# Patient Record
Sex: FEMALE | Race: WHITE
[De-identification: names, ages, dates, MRNs, and addresses within clinical notes are randomized per-mention and may not be internally consistent; named-entity substitution may affect disease eponyms.]

---

## 2017-01-10 ENCOUNTER — APPOINTMENT (OUTPATIENT)
Dept: FAMILY MEDICINE | Facility: CLINIC | Age: 47
End: 2017-01-10

## 2019-05-08 ENCOUNTER — APPOINTMENT (OUTPATIENT)
Dept: FAMILY MEDICINE | Facility: CLINIC | Age: 49
End: 2019-05-08
Payer: COMMERCIAL

## 2019-05-08 VITALS
DIASTOLIC BLOOD PRESSURE: 62 MMHG | OXYGEN SATURATION: 98 % | RESPIRATION RATE: 16 BRPM | HEIGHT: 65 IN | BODY MASS INDEX: 29.99 KG/M2 | SYSTOLIC BLOOD PRESSURE: 118 MMHG | HEART RATE: 109 BPM | WEIGHT: 180 LBS

## 2019-05-08 DIAGNOSIS — M21.619 BUNION OF UNSPECIFIED FOOT: ICD-10-CM

## 2019-05-08 DIAGNOSIS — Z01.818 ENCOUNTER FOR OTHER PREPROCEDURAL EXAMINATION: ICD-10-CM

## 2019-05-08 PROCEDURE — 99244 OFF/OP CNSLTJ NEW/EST MOD 40: CPT | Mod: 25

## 2019-05-08 PROCEDURE — 36415 COLL VENOUS BLD VENIPUNCTURE: CPT

## 2019-05-08 RX ORDER — BUPROPION HYDROCHLORIDE 100 MG/1
100 TABLET, FILM COATED ORAL DAILY
Refills: 0 | Status: ACTIVE | COMMUNITY

## 2019-05-08 NOTE — RESULTS/DATA
[] : results reviewed [Normal] : The 12 - lead ECG is normal [Sinus Tachycardia] : sinus tachycardia [de-identified] : Normal

## 2019-05-08 NOTE — CONSULT LETTER
[Dear  ___] : Dear  [unfilled], [Please see my note below.] : Please see my note below. [Consult Letter:] : I had the pleasure of evaluating your patient, [unfilled]. [Sincerely,] : Sincerely, [Consult Closing:] : Thank you very much for allowing me to participate in the care of this patient.  If you have any questions, please do not hesitate to contact me.

## 2019-05-08 NOTE — HISTORY OF PRESENT ILLNESS
[FreeTextEntry1] : Left bunionectomy [FreeTextEntry2] : May 10 [FreeTextEntry3] : Dr. Nguyen [FreeTextEntry4] : Patient to have bunionectomy\par \par No cardiovascular symptoms walks regularly tachycardia with anxiety\par \par History of syncope 3 years ago one episode only.  No definite etiology found except patient has bicuspid aortic valve incidentally\par \par Emotionally stable on current medications\par \par Patient has not taken levothyroxine in the past 3 years no hypothyroid symptoms

## 2019-05-08 NOTE — PHYSICAL EXAM
[No Acute Distress] : no acute distress [No Lymphadenopathy] : no lymphadenopathy [Supple] : supple [Regular Rhythm] : with a regular rhythm [Clear to Auscultation] : lungs were clear to auscultation bilaterally [Thyroid Normal, No Nodules] : the thyroid was normal and there were no nodules present [No Carotid Bruits] : no carotid bruits [Pedal Pulses Present] : the pedal pulses are present [No Edema] : there was no peripheral edema [Normal Anterior Cervical Nodes] : no anterior cervical lymphadenopathy [de-identified] : 1/6 systolic ejection murmur

## 2019-05-09 LAB
T3FREE SERPL-MCNC: 3.56 PG/ML
T4 FREE SERPL-MCNC: 1 NG/DL
TSH SERPL-ACNC: 5.08 UIU/ML

## 2020-05-07 ENCOUNTER — TRANSCRIPTION ENCOUNTER (OUTPATIENT)
Age: 50
End: 2020-05-07

## 2020-11-06 DIAGNOSIS — Z12.11 ENCOUNTER FOR SCREENING FOR MALIGNANT NEOPLASM OF COLON: ICD-10-CM

## 2024-03-21 ENCOUNTER — APPOINTMENT (OUTPATIENT)
Dept: FAMILY MEDICINE | Facility: CLINIC | Age: 54
End: 2024-03-21
Payer: COMMERCIAL

## 2024-03-21 ENCOUNTER — LABORATORY RESULT (OUTPATIENT)
Age: 54
End: 2024-03-21

## 2024-03-21 VITALS
WEIGHT: 227 LBS | SYSTOLIC BLOOD PRESSURE: 122 MMHG | DIASTOLIC BLOOD PRESSURE: 70 MMHG | TEMPERATURE: 97 F | BODY MASS INDEX: 37.82 KG/M2 | HEART RATE: 100 BPM | HEIGHT: 65 IN | OXYGEN SATURATION: 98 %

## 2024-03-21 DIAGNOSIS — M54.32 SCIATICA, LEFT SIDE: ICD-10-CM

## 2024-03-21 DIAGNOSIS — F32.A DEPRESSION, UNSPECIFIED: ICD-10-CM

## 2024-03-21 DIAGNOSIS — Z00.00 ENCOUNTER FOR GENERAL ADULT MEDICAL EXAMINATION W/OUT ABNORMAL FINDINGS: ICD-10-CM

## 2024-03-21 DIAGNOSIS — Z12.39 ENCOUNTER FOR OTHER SCREENING FOR MALIGNANT NEOPLASM OF BREAST: ICD-10-CM

## 2024-03-21 DIAGNOSIS — F41.9 ANXIETY DISORDER, UNSPECIFIED: ICD-10-CM

## 2024-03-21 DIAGNOSIS — Z13.220 ENCOUNTER FOR SCREENING FOR LIPOID DISORDERS: ICD-10-CM

## 2024-03-21 DIAGNOSIS — Z13.31 ENCOUNTER FOR SCREENING FOR DEPRESSION: ICD-10-CM

## 2024-03-21 DIAGNOSIS — Z78.9 OTHER SPECIFIED HEALTH STATUS: ICD-10-CM

## 2024-03-21 DIAGNOSIS — M79.669 PAIN IN UNSPECIFIED LOWER LEG: ICD-10-CM

## 2024-03-21 DIAGNOSIS — Z13.1 ENCOUNTER FOR SCREENING FOR DIABETES MELLITUS: ICD-10-CM

## 2024-03-21 PROCEDURE — 99214 OFFICE O/P EST MOD 30 MIN: CPT | Mod: 25

## 2024-03-21 PROCEDURE — 99396 PREV VISIT EST AGE 40-64: CPT

## 2024-03-21 RX ORDER — CYCLOBENZAPRINE HYDROCHLORIDE 10 MG/1
10 TABLET, FILM COATED ORAL
Qty: 30 | Refills: 0 | Status: ACTIVE | COMMUNITY
Start: 2024-03-21 | End: 1900-01-01

## 2024-03-21 RX ORDER — METHYLPREDNISOLONE 4 MG/1
4 TABLET ORAL
Qty: 1 | Refills: 0 | Status: ACTIVE | COMMUNITY
Start: 2024-03-21 | End: 1900-01-01

## 2024-03-21 RX ORDER — DEXTROAMPHETAMINE SACCHARATE, AMPHETAMINE ASPARTATE, DEXTROAMPHETAMINE SULFATE, AND AMPHETAMINE SULFATE 5; 5; 5; 5 MG/1; MG/1; MG/1; MG/1
20 TABLET ORAL
Refills: 0 | Status: ACTIVE | COMMUNITY

## 2024-03-21 RX ORDER — MELOXICAM 15 MG/1
15 TABLET ORAL DAILY
Qty: 30 | Refills: 0 | Status: ACTIVE | COMMUNITY
Start: 2024-03-21 | End: 1900-01-01

## 2024-03-21 RX ORDER — ARIPIPRAZOLE 10 MG/1
10 TABLET ORAL
Refills: 0 | Status: ACTIVE | COMMUNITY

## 2024-03-21 NOTE — PLAN
[FreeTextEntry1] : Leg pain  - trial of medrol amelia and anti-inflammatory - f/u doppler - f/u labs  Depression - controlled, follows psych  Hypothyroidism - f/u labs  HCM - f/u labs - mammo ref - colon CA screening  - routine GYN - recommend shingrix

## 2024-03-21 NOTE — PHYSICAL EXAM
[No Acute Distress] : no acute distress [Well-Appearing] : well-appearing [Normal Sclera/Conjunctiva] : normal sclera/conjunctiva [PERRL] : pupils equal round and reactive to light [Normal Oropharynx] : the oropharynx was normal [Normal Outer Ear/Nose] : the outer ears and nose were normal in appearance [Normal TMs] : both tympanic membranes were normal [No Lymphadenopathy] : no lymphadenopathy [Supple] : supple [No Respiratory Distress] : no respiratory distress  [No Accessory Muscle Use] : no accessory muscle use [Clear to Auscultation] : lungs were clear to auscultation bilaterally [Normal Rate] : normal rate  [Regular Rhythm] : with a regular rhythm [Soft] : abdomen soft [Non Tender] : non-tender [Non-distended] : non-distended [Normal Bowel Sounds] : normal bowel sounds [No Focal Deficits] : no focal deficits [Normal Affect] : the affect was normal [Normal Insight/Judgement] : insight and judgment were intact [No Joint Swelling] : no joint swelling [Grossly Normal Strength/Tone] : grossly normal strength/tone [de-identified] : + TTP to left leg, + pain when lifting leg

## 2024-03-21 NOTE — HISTORY OF PRESENT ILLNESS
[FreeTextEntry1] : CPE [de-identified] : 53 year old female presents for CPE.  Complains of low back pain with left sided leg pain she believes to be sciatica. This started about 3 weeks ago and has difficulty walking. Sometimes pain is sharp and shooting but now having muscle pain in calf and leg. Has not improved. Denies swelling. No known precipitating events  She has not had labs in a few years. No recent mammo or colonoscopy

## 2024-03-21 NOTE — HEALTH RISK ASSESSMENT
[3] : 1) Little interest or pleasure doing things for nearly every day (3) [2] : 2) Feeling down, depressed, or hopeless for more than half of the days (2) [PHQ-2 Positive] : PHQ-2 Positive [Current] : Current [No] : No [10-14] : 10-14 [< 15 Years] : < 15 Years [Nearly Every Day (3)] : 1.) Little interest or pleasure in doing things? Nearly every day [1/2 of Days or More (2)] : 2.) Feeling down, depressed or hopeless? Half the days or more [Not at All (0)] : 8.) Moving or speaking so slowly that other people could have noticed, or the opposite, moving or speaking faster than usual? Not at all [Mild] : Severity of Depression is Mild [PHQ-9 Positive] : PHQ-9 Positive [I have developed a follow-up plan documented below in the note.] : I have developed a follow-up plan documented below in the note. [de-identified] : balanced [de-identified] : limited due to pain [[id number]] : 5 [[id number]] : 5 [de-identified] : social smoker, 1-2 cigs a month

## 2024-03-22 DIAGNOSIS — R70.0 ELEVATED ERYTHROCYTE SEDIMENTATION RATE: ICD-10-CM

## 2024-03-22 DIAGNOSIS — E03.9 HYPOTHYROIDISM, UNSPECIFIED: ICD-10-CM

## 2024-03-22 LAB
ALBUMIN SERPL ELPH-MCNC: 4.1 G/DL
ALP BLD-CCNC: 95 U/L
ALT SERPL-CCNC: 20 U/L
ANION GAP SERPL CALC-SCNC: 12 MMOL/L
AST SERPL-CCNC: 18 U/L
BASOPHILS # BLD AUTO: 0.06 K/UL
BASOPHILS NFR BLD AUTO: 0.9 %
BILIRUB SERPL-MCNC: 0.2 MG/DL
BUN SERPL-MCNC: 16 MG/DL
CALCIUM SERPL-MCNC: 9.4 MG/DL
CHLORIDE SERPL-SCNC: 100 MMOL/L
CHOLEST SERPL-MCNC: 303 MG/DL
CK SERPL-CCNC: 76 U/L
CO2 SERPL-SCNC: 27 MMOL/L
CREAT SERPL-MCNC: 1.11 MG/DL
CRP SERPL-MCNC: 10 MG/L
EGFR: 59 ML/MIN/1.73M2
EOSINOPHIL # BLD AUTO: 0.21 K/UL
EOSINOPHIL NFR BLD AUTO: 3.3 %
ERYTHROCYTE [SEDIMENTATION RATE] IN BLOOD BY WESTERGREN METHOD: 63 MM/HR
ESTIMATED AVERAGE GLUCOSE: 111 MG/DL
FERRITIN SERPL-MCNC: 17 NG/ML
FOLATE SERPL-MCNC: 9.5 NG/ML
GLUCOSE SERPL-MCNC: 99 MG/DL
HBA1C MFR BLD HPLC: 5.5 %
HCT VFR BLD CALC: 42.3 %
HDLC SERPL-MCNC: 88 MG/DL
HGB BLD-MCNC: 13.8 G/DL
IMM GRANULOCYTES NFR BLD AUTO: 0.3 %
IRON SATN MFR SERPL: 22 %
IRON SERPL-MCNC: 91 UG/DL
LDLC SERPL CALC-MCNC: 186 MG/DL
LYMPHOCYTES # BLD AUTO: 2.48 K/UL
LYMPHOCYTES NFR BLD AUTO: 38.4 %
MAGNESIUM SERPL-MCNC: 2.4 MG/DL
MAN DIFF?: NORMAL
MCHC RBC-ENTMCNC: 29.1 PG
MCHC RBC-ENTMCNC: 32.6 GM/DL
MCV RBC AUTO: 89.2 FL
MONOCYTES # BLD AUTO: 0.37 K/UL
MONOCYTES NFR BLD AUTO: 5.7 %
NEUTROPHILS # BLD AUTO: 3.31 K/UL
NEUTROPHILS NFR BLD AUTO: 51.4 %
NONHDLC SERPL-MCNC: 215 MG/DL
PLATELET # BLD AUTO: 361 K/UL
POTASSIUM SERPL-SCNC: 4.6 MMOL/L
PROT SERPL-MCNC: 7 G/DL
RBC # BLD: 4.74 M/UL
RBC # FLD: 14.1 %
SODIUM SERPL-SCNC: 140 MMOL/L
TIBC SERPL-MCNC: 417 UG/DL
TRIGL SERPL-MCNC: 162 MG/DL
TSH SERPL-ACNC: 6.31 UIU/ML
UIBC SERPL-MCNC: 326 UG/DL
VIT B12 SERPL-MCNC: 469 PG/ML
WBC # FLD AUTO: 6.45 K/UL

## 2024-03-22 RX ORDER — LEVOTHYROXINE SODIUM 0.05 MG/1
50 TABLET ORAL
Qty: 90 | Refills: 0 | Status: ACTIVE | COMMUNITY
Start: 2024-03-22 | End: 1900-01-01

## 2024-03-25 ENCOUNTER — NON-APPOINTMENT (OUTPATIENT)
Age: 54
End: 2024-03-25

## 2024-04-01 LAB
A PHAGOCYTOPH IGG TITR SER IF: NORMAL
ANA SER IF-ACNC: NEGATIVE
B BURGDOR AB SER QL IA: 0.07 IV
B MICROTI IGG TITR SER: NORMAL
E CHAFFEENSIS IGG TITR SER IF: NORMAL
ENA JO1 AB SER IA-ACNC: <0.2 AL

## 2024-04-09 ENCOUNTER — TRANSCRIPTION ENCOUNTER (OUTPATIENT)
Age: 54
End: 2024-04-09

## 2024-04-24 ENCOUNTER — NON-APPOINTMENT (OUTPATIENT)
Age: 54
End: 2024-04-24

## 2024-04-26 ENCOUNTER — NON-APPOINTMENT (OUTPATIENT)
Age: 54
End: 2024-04-26

## 2024-04-26 DIAGNOSIS — M79.605 PAIN IN LEFT LEG: ICD-10-CM

## 2024-05-02 ENCOUNTER — APPOINTMENT (OUTPATIENT)
Dept: ORTHOPEDIC SURGERY | Facility: CLINIC | Age: 54
End: 2024-05-02

## 2024-06-06 ENCOUNTER — OUTPATIENT (OUTPATIENT)
Dept: OUTPATIENT SERVICES | Facility: HOSPITAL | Age: 54
LOS: 1 days | End: 2024-06-06
Payer: COMMERCIAL

## 2024-06-06 ENCOUNTER — APPOINTMENT (OUTPATIENT)
Dept: MRI IMAGING | Facility: CLINIC | Age: 54
End: 2024-06-06
Payer: COMMERCIAL

## 2024-06-06 DIAGNOSIS — M79.605 PAIN IN LEFT LEG: ICD-10-CM

## 2024-06-06 PROCEDURE — 73720 MRI LWR EXTREMITY W/O&W/DYE: CPT | Mod: 26,LT

## 2024-06-06 PROCEDURE — 73720 MRI LWR EXTREMITY W/O&W/DYE: CPT

## 2024-06-18 ENCOUNTER — APPOINTMENT (OUTPATIENT)
Dept: ORTHOPEDIC SURGERY | Facility: CLINIC | Age: 54
End: 2024-06-18
Payer: COMMERCIAL

## 2024-06-18 VITALS
DIASTOLIC BLOOD PRESSURE: 85 MMHG | BODY MASS INDEX: 37.82 KG/M2 | HEART RATE: 134 BPM | WEIGHT: 227 LBS | HEIGHT: 65 IN | SYSTOLIC BLOOD PRESSURE: 139 MMHG

## 2024-06-18 DIAGNOSIS — M16.12 UNILATERAL PRIMARY OSTEOARTHRITIS, LEFT HIP: ICD-10-CM

## 2024-06-18 PROCEDURE — 73502 X-RAY EXAM HIP UNI 2-3 VIEWS: CPT | Mod: LT

## 2024-06-18 PROCEDURE — 99204 OFFICE O/P NEW MOD 45 MIN: CPT

## 2024-06-18 NOTE — PHYSICAL EXAM
[de-identified] : Left hip:  Pain with deep flexion and IR.  Limited internal rotation. Straight leg raise: Positive Contralateral Hip: Normal ROM without pain on IR/ER Grossly normal motor and sensory examination of bilateral lower extremities  Lumbar spine Palpation: Tender to palpation at paraspinal muscles Motor: 4/5 L2, 5/5 L3-S1 Sensory: 2/2 L2-S1 Straight leg raise: Negative Clonus: < 5 beats [de-identified] : 6/18/24: AP Pelvis and 2 views of the left hip were reviewed and demonstrate degenerative joint disease of the hip with joint space narrowing, osteophyte formation, and subchondral sclerosis. MRI lumbar spine-left foraminal disc osteophyte complex possible impingement on left L4 nerve root MRI left femur 6/6/2024-severe left hip osteoarthritis

## 2024-06-18 NOTE — HISTORY OF PRESENT ILLNESS
[de-identified] : 6/18/24: Patient presents with left hip pain for 3 months.  Does have pain in the low back, pain in her groin pain that radiates down her leg.  Occasionally pain goes past her knee but not always.  Does have some numbness on the left foot.  Ambulating with a cane due to this pain.  Taking Advil and meloxicam which provides minimal relief.  Denies prior treatments.  Did do injections and physical therapy for her back which she did not feel were very helpful in the past.  Also tried Medrol Dosepak which did not help in April.  Allergies to sulfa, and denies anticoagulation, history of DVTs, tobacco use, PMH-hypothyroidism.

## 2024-06-18 NOTE — DISCUSSION/SUMMARY
[de-identified] : Severe left hip osteoarthritis, lumbar radiculopathy  Extensive discussion of the natural history of this disease was had with the patient.  We discussed the treatment options ranging from conservative therapy which includes anti-inflammatories, steroid/HA injections, physical therapy, weight loss, knee sleeves/braces, and activity modification.  We did discuss that the ultimate treatment for arthritis is a joint replacement.  I discussed does seem most of her pain is coming from her hip however I do believe a steroid injection will help differentiate the pain from her hip versus her back.  We discussed a hip replacement is a cure for hip arthritis although this will not help any of the pain coming from her back.  She pay close attention how she feels after the steroid injection while the numbing medication is active to see if she has significant relief. Patient will follow-up next month a couple weeks before her trip, discussed we could consider a Toradol injection if she needs short-term relief for the trip.  The patient's current medication management of their orthopedic diagnosis was reviewed today: (1) We discussed a comprehensive treatment plan that included possible pharmaceutical management involving the use of prescription strength medications including but not limited to options such as oral Ibuprofen 400mg QID, once daily Meloxicam 15 mg, or 500-650 mg Tylenol versus over the counter oral medications and topical prescription NSAID Pennsaid vs over the counter Voltaren gel. (2) There is a moderate risk of morbidity with further treatment, especially from use of prescription strength medications and possible side effects of these medications which include upset stomach with oral medications, skin reactions to topical medications and cardiac/renal issues with long term use. (3) I recommended that the patient follow-up with their medical physician to discuss any significant specific potential issues with long term medication use such as interactions with current medications or with exacerbation of underlying medical comorbidities. (4) The benefits and risks associated with use of injectable, oral or topical, prescription and over the counter anti-inflammatory medications were discussed with the patient. The patient voiced understanding of the risks including but not limited to bleeding, stroke, kidney dysfunction, heart disease, and were referred to the black box warning label for further information.

## 2024-07-09 ENCOUNTER — APPOINTMENT (OUTPATIENT)
Dept: ULTRASOUND IMAGING | Facility: CLINIC | Age: 54
End: 2024-07-09
Payer: COMMERCIAL

## 2024-07-09 ENCOUNTER — OUTPATIENT (OUTPATIENT)
Dept: OUTPATIENT SERVICES | Facility: HOSPITAL | Age: 54
LOS: 1 days | End: 2024-07-09
Payer: COMMERCIAL

## 2024-07-09 DIAGNOSIS — Z00.8 ENCOUNTER FOR OTHER GENERAL EXAMINATION: ICD-10-CM

## 2024-07-09 PROCEDURE — 20611 DRAIN/INJ JOINT/BURSA W/US: CPT

## 2024-07-09 PROCEDURE — 20611 DRAIN/INJ JOINT/BURSA W/US: CPT | Mod: LT

## 2024-08-09 ENCOUNTER — APPOINTMENT (OUTPATIENT)
Dept: ORTHOPEDIC SURGERY | Facility: CLINIC | Age: 54
End: 2024-08-09

## 2024-08-09 PROCEDURE — 99215 OFFICE O/P EST HI 40 MIN: CPT

## 2024-08-09 PROCEDURE — 72100 X-RAY EXAM L-S SPINE 2/3 VWS: CPT

## 2024-08-09 NOTE — PHYSICAL EXAM
[de-identified] : Left hip:  Pain with deep flexion and IR.  Limited internal rotation. Straight leg raise: Positive Contralateral Hip: Normal ROM without pain on IR/ER Grossly normal motor and sensory examination of bilateral lower extremities  Lumbar spine Palpation: Tender to palpation at paraspinal muscles Motor: 4/5 L2, 5/5 L3-S1 Sensory: 2/2 L2-S1 Straight leg raise: Negative Clonus: < 5 beats [de-identified] : 8/9/24: 2 views lateral lumbar spine sitting and standing-transitional vertebrae-standing sacral slope 38, sitting 7 6/18/24: AP Pelvis and 2 views of the left hip were reviewed and demonstrate degenerative joint disease of the hip with joint space narrowing, osteophyte formation, and subchondral sclerosis. MRI lumbar spine-left foraminal disc osteophyte complex possible impingement on left L4 nerve root MRI left femur 6/6/2024-severe left hip osteoarthritis

## 2024-08-09 NOTE — PHYSICAL EXAM
[de-identified] : Left hip:  Pain with deep flexion and IR.  Limited internal rotation. Straight leg raise: Positive Contralateral Hip: Normal ROM without pain on IR/ER Grossly normal motor and sensory examination of bilateral lower extremities  Lumbar spine Palpation: Tender to palpation at paraspinal muscles Motor: 4/5 L2, 5/5 L3-S1 Sensory: 2/2 L2-S1 Straight leg raise: Negative Clonus: < 5 beats [de-identified] : 8/9/24: 2 views lateral lumbar spine sitting and standing-transitional vertebrae-standing sacral slope 38, sitting 7 6/18/24: AP Pelvis and 2 views of the left hip were reviewed and demonstrate degenerative joint disease of the hip with joint space narrowing, osteophyte formation, and subchondral sclerosis. MRI lumbar spine-left foraminal disc osteophyte complex possible impingement on left L4 nerve root MRI left femur 6/6/2024-severe left hip osteoarthritis

## 2024-08-09 NOTE — HISTORY OF PRESENT ILLNESS
[de-identified] : 8/9/24: Patient here for follow-up left hip pain.  States she had a steroid injection which provide relief for approximately 4 days.  Did still have some pain however she did feel better.  States the groin pain and improved but she had pain around the injection site.  Ambulating with a cane.  States she would like to move forward with a hip replacement as she feels extremely limited by this and she was concerned about  her daughter is going to college and her being unable to go.  Questionable allergy to sulfa-Bactrim, states they were concerned about leukemia but ultimately blames it on the Bactrim.  6/18/24: Patient presents with left hip pain for 3 months.  Does have pain in the low back, pain in her groin pain that radiates down her leg.  Occasionally pain goes past her knee but not always.  Does have some numbness on the left foot.  Ambulating with a cane due to this pain.  Taking Advil and meloxicam which provides minimal relief.  Denies prior treatments.  Did do injections and physical therapy for her back which she did not feel were very helpful in the past.  Also tried Medrol Dosepak which did not help in April.  Allergies to sulfa, and denies anticoagulation, history of DVTs, tobacco use, PMH-hypothyroidism.

## 2024-08-09 NOTE — DISCUSSION/SUMMARY
[de-identified] : Severe left hip osteoarthritis, lumbar radiculopathy  Left hip arthritis with limitations of activities of daily living and conservative treatment options failing to improve disability.  Plan: Left total Hip Arthroplasty  The patient has arthritis/end stage joint disease of the hip supported by x-ray or MRI that demonstrated one of the following:  periarticular osteophytes; joint space narrowing; avascular necrosis; subchondral cysts; subchondral sclerosis; or bone on bone articulation;  One or more of the following conservative treatments have been tried and failed for 3 months or more: analgesic medication; home exercises; anti-inflammatory medication; physical therapy; cortisone shots; use of walker or cane;  We discussed the natural history of hip arthritis and the potential treatment options. The importance of diet and exercise was discussed as excess weight is a significant contributing factor. Due to the pain, failure of prior nonoperative treatment and associated disability, the patient is indicated for a total hip replacement.   A risk/benefit analysis was discussed with the patient reviewing the advantages and disadvantages of surgical intervention at this time. Both the level and length of the patient's pain have made additional conservative treatment measures contraindicated. A full explanation was given of the nature and the purpose of the procedure and anesthesia, its benefits, possible alternative methods of treatment, the risks involved, the possibility of complications, the foreseeable consequences of the procedure and the possible results of the non-treatment. I reviewed the plan of care as well as a model of a total hip implant equivalent to the one that will be used for their total hip replacement. The patient agrees with the plan of care as well as the use of implants for their replacement. We also discussed that if robotic/computer navigation is utilized, then additional incisions may need to be made to accommodate the computer navigation arrays.  The potential biologic and mechanical risks of the procedure were discussed. This discussion included but was not limited to thromboembolic disease, fracture, loss of fixation, infection, leg length discrepancy, dislocation and neurovascular injury. The patient is also understands that anesthesia and their comorbidities present additional risks. Proper expectations were addressed as this surgery may only partially or even fail to relieve their pain. The patient understands that additional surgery may be needed during the perioperative period or in the future. We discussed the durability of prosthetic hips and limitations related to wear, osteolysis and loosening. All questions were answered the patient's satisfaction. The patient was given my packet of additional information about the procedure.  Expect 1-2 day stay in hospital as this is less than standard across the country.  Although outpatient surgery may be feasible in carefully selected heathy patients - the definition of a "healthy" patient to do this in has not been properly studied in randomized trials.  This hospital time is important to observe for urinary retention, neurologic decline, cardiopulmonary issues, and signs of blood clot which are frequent early complications of joint replacements.  This time will also be used to work with PT so they are safe to navigate without falling which could lead to fracture and more surgery.   There is no evidence for any of the following contraindications for total joint replacement surgery:  active infection; active systemic bacteremia; active skin infection or open wound at surgical site; neuropathic arthritis; severe, rapidly progressive neurologic disease; severe medical conditions that makes the risks of surgery outweigh the potential benefit.  The hip/groin pain is affecting the ability to perform activities of daily living despite activity modifications.  The painful hip exam and the radiographic findings of cartilage loss are consistent with the hip OA as the source of the disability and pain. Patient has failed conservative treatment.  We did discuss the patient's BMI over 35 does increase her risk of infection and likely doubles this risk from 1 to 2%.  I will try to minimize this with extended antibiotics and negative pressure therapy.  Surgery will be scheduled at a convenient time.   Preop dental, medical clearance.  CT scan for preop planning with Delvis standing sacral slope 38, sitting 7 Postop DVT ppx: Per Caprini Score Abx ppx: Ancef with extended Duricef secondary to patient's elevated BMI Dressing: Triston

## 2024-08-09 NOTE — HISTORY OF PRESENT ILLNESS
[de-identified] : 8/9/24: Patient here for follow-up left hip pain.  States she had a steroid injection which provide relief for approximately 4 days.  Did still have some pain however she did feel better.  States the groin pain and improved but she had pain around the injection site.  Ambulating with a cane.  States she would like to move forward with a hip replacement as she feels extremely limited by this and she was concerned about  her daughter is going to college and her being unable to go.  Questionable allergy to sulfa-Bactrim, states they were concerned about leukemia but ultimately blames it on the Bactrim.  6/18/24: Patient presents with left hip pain for 3 months.  Does have pain in the low back, pain in her groin pain that radiates down her leg.  Occasionally pain goes past her knee but not always.  Does have some numbness on the left foot.  Ambulating with a cane due to this pain.  Taking Advil and meloxicam which provides minimal relief.  Denies prior treatments.  Did do injections and physical therapy for her back which she did not feel were very helpful in the past.  Also tried Medrol Dosepak which did not help in April.  Allergies to sulfa, and denies anticoagulation, history of DVTs, tobacco use, PMH-hypothyroidism.

## 2024-08-09 NOTE — DISCUSSION/SUMMARY
[de-identified] : Severe left hip osteoarthritis, lumbar radiculopathy  Left hip arthritis with limitations of activities of daily living and conservative treatment options failing to improve disability.  Plan: Left total Hip Arthroplasty  The patient has arthritis/end stage joint disease of the hip supported by x-ray or MRI that demonstrated one of the following:  periarticular osteophytes; joint space narrowing; avascular necrosis; subchondral cysts; subchondral sclerosis; or bone on bone articulation;  One or more of the following conservative treatments have been tried and failed for 3 months or more: analgesic medication; home exercises; anti-inflammatory medication; physical therapy; cortisone shots; use of walker or cane;  We discussed the natural history of hip arthritis and the potential treatment options. The importance of diet and exercise was discussed as excess weight is a significant contributing factor. Due to the pain, failure of prior nonoperative treatment and associated disability, the patient is indicated for a total hip replacement.   A risk/benefit analysis was discussed with the patient reviewing the advantages and disadvantages of surgical intervention at this time. Both the level and length of the patient's pain have made additional conservative treatment measures contraindicated. A full explanation was given of the nature and the purpose of the procedure and anesthesia, its benefits, possible alternative methods of treatment, the risks involved, the possibility of complications, the foreseeable consequences of the procedure and the possible results of the non-treatment. I reviewed the plan of care as well as a model of a total hip implant equivalent to the one that will be used for their total hip replacement. The patient agrees with the plan of care as well as the use of implants for their replacement. We also discussed that if robotic/computer navigation is utilized, then additional incisions may need to be made to accommodate the computer navigation arrays.  The potential biologic and mechanical risks of the procedure were discussed. This discussion included but was not limited to thromboembolic disease, fracture, loss of fixation, infection, leg length discrepancy, dislocation and neurovascular injury. The patient is also understands that anesthesia and their comorbidities present additional risks. Proper expectations were addressed as this surgery may only partially or even fail to relieve their pain. The patient understands that additional surgery may be needed during the perioperative period or in the future. We discussed the durability of prosthetic hips and limitations related to wear, osteolysis and loosening. All questions were answered the patient's satisfaction. The patient was given my packet of additional information about the procedure.  Expect 1-2 day stay in hospital as this is less than standard across the country.  Although outpatient surgery may be feasible in carefully selected heathy patients - the definition of a "healthy" patient to do this in has not been properly studied in randomized trials.  This hospital time is important to observe for urinary retention, neurologic decline, cardiopulmonary issues, and signs of blood clot which are frequent early complications of joint replacements.  This time will also be used to work with PT so they are safe to navigate without falling which could lead to fracture and more surgery.   There is no evidence for any of the following contraindications for total joint replacement surgery:  active infection; active systemic bacteremia; active skin infection or open wound at surgical site; neuropathic arthritis; severe, rapidly progressive neurologic disease; severe medical conditions that makes the risks of surgery outweigh the potential benefit.  The hip/groin pain is affecting the ability to perform activities of daily living despite activity modifications.  The painful hip exam and the radiographic findings of cartilage loss are consistent with the hip OA as the source of the disability and pain. Patient has failed conservative treatment.  We did discuss the patient's BMI over 35 does increase her risk of infection and likely doubles this risk from 1 to 2%.  I will try to minimize this with extended antibiotics and negative pressure therapy.  Surgery will be scheduled at a convenient time.   Preop dental, medical clearance.  CT scan for preop planning with Delvis standing sacral slope 38, sitting 7 Postop DVT ppx: Per Caprini Score Abx ppx: Ancef with extended Duricef secondary to patient's elevated BMI Dressing: Triston

## 2024-08-14 ENCOUNTER — APPOINTMENT (OUTPATIENT)
Dept: ORTHOPEDIC SURGERY | Facility: CLINIC | Age: 54
End: 2024-08-14

## 2024-11-18 ENCOUNTER — APPOINTMENT (OUTPATIENT)
Dept: ORTHOPEDIC SURGERY | Facility: HOSPITAL | Age: 54
End: 2024-11-18

## 2025-02-17 ENCOUNTER — NON-APPOINTMENT (OUTPATIENT)
Age: 55
End: 2025-02-17

## 2025-03-06 ENCOUNTER — APPOINTMENT (OUTPATIENT)
Dept: FAMILY MEDICINE | Facility: CLINIC | Age: 55
End: 2025-03-06
Payer: COMMERCIAL

## 2025-03-06 VITALS
OXYGEN SATURATION: 98 % | HEART RATE: 120 BPM | DIASTOLIC BLOOD PRESSURE: 78 MMHG | BODY MASS INDEX: 37.32 KG/M2 | TEMPERATURE: 98.4 F | WEIGHT: 224 LBS | SYSTOLIC BLOOD PRESSURE: 124 MMHG | HEIGHT: 65 IN

## 2025-03-06 DIAGNOSIS — F41.9 ANXIETY DISORDER, UNSPECIFIED: ICD-10-CM

## 2025-03-06 DIAGNOSIS — Z13.1 ENCOUNTER FOR SCREENING FOR DIABETES MELLITUS: ICD-10-CM

## 2025-03-06 DIAGNOSIS — Z13.220 ENCOUNTER FOR SCREENING FOR LIPOID DISORDERS: ICD-10-CM

## 2025-03-06 DIAGNOSIS — E66.01 MORBID (SEVERE) OBESITY DUE TO EXCESS CALORIES: ICD-10-CM

## 2025-03-06 DIAGNOSIS — F98.8 OTHER SPECIFIED BEHAVIORAL AND EMOTIONAL DISORDERS WITH ONSET USUALLY OCCURRING IN CHILDHOOD AND ADOLESCENCE: ICD-10-CM

## 2025-03-06 DIAGNOSIS — E03.9 HYPOTHYROIDISM, UNSPECIFIED: ICD-10-CM

## 2025-03-06 PROCEDURE — G2211 COMPLEX E/M VISIT ADD ON: CPT | Mod: NC

## 2025-03-06 PROCEDURE — 99214 OFFICE O/P EST MOD 30 MIN: CPT

## 2025-03-07 LAB
ALBUMIN SERPL ELPH-MCNC: 4.7 G/DL
ALP BLD-CCNC: 100 U/L
ALT SERPL-CCNC: 25 U/L
ANION GAP SERPL CALC-SCNC: 16 MMOL/L
AST SERPL-CCNC: 22 U/L
BASOPHILS # BLD AUTO: 0.09 K/UL
BASOPHILS NFR BLD AUTO: 1.2 %
BILIRUB SERPL-MCNC: 0.2 MG/DL
BUN SERPL-MCNC: 13 MG/DL
CALCIUM SERPL-MCNC: 9.9 MG/DL
CHLORIDE SERPL-SCNC: 103 MMOL/L
CHOLEST SERPL-MCNC: 289 MG/DL
CO2 SERPL-SCNC: 24 MMOL/L
CREAT SERPL-MCNC: 1.19 MG/DL
EGFRCR SERPLBLD CKD-EPI 2021: 54 ML/MIN/1.73M2
EOSINOPHIL # BLD AUTO: 0.19 K/UL
EOSINOPHIL NFR BLD AUTO: 2.5 %
ESTIMATED AVERAGE GLUCOSE: 111 MG/DL
GLUCOSE SERPL-MCNC: 116 MG/DL
HBA1C MFR BLD HPLC: 5.5 %
HCT VFR BLD CALC: 46.1 %
HDLC SERPL-MCNC: 80 MG/DL
HGB BLD-MCNC: 14.4 G/DL
IMM GRANULOCYTES NFR BLD AUTO: 0.3 %
LDLC SERPL CALC-MCNC: 188 MG/DL
LYMPHOCYTES # BLD AUTO: 2.8 K/UL
LYMPHOCYTES NFR BLD AUTO: 36.6 %
MAN DIFF?: NORMAL
MCHC RBC-ENTMCNC: 28.7 PG
MCHC RBC-ENTMCNC: 31.2 G/DL
MCV RBC AUTO: 92 FL
MONOCYTES # BLD AUTO: 0.5 K/UL
MONOCYTES NFR BLD AUTO: 6.5 %
NEUTROPHILS # BLD AUTO: 4.06 K/UL
NEUTROPHILS NFR BLD AUTO: 52.9 %
NONHDLC SERPL-MCNC: 208 MG/DL
PLATELET # BLD AUTO: 391 K/UL
POTASSIUM SERPL-SCNC: 4.6 MMOL/L
PROT SERPL-MCNC: 7.3 G/DL
RBC # BLD: 5.01 M/UL
RBC # FLD: 13.9 %
SODIUM SERPL-SCNC: 142 MMOL/L
TRIGL SERPL-MCNC: 116 MG/DL
TSH SERPL-ACNC: 4.12 UIU/ML
WBC # FLD AUTO: 7.66 K/UL

## 2025-03-11 ENCOUNTER — TRANSCRIPTION ENCOUNTER (OUTPATIENT)
Age: 55
End: 2025-03-11

## 2025-03-12 RX ORDER — TIRZEPATIDE 2.5 MG/.5ML
2.5 INJECTION, SOLUTION SUBCUTANEOUS
Qty: 1 | Refills: 0 | Status: ACTIVE | COMMUNITY
Start: 2025-03-06

## 2025-03-17 ENCOUNTER — TRANSCRIPTION ENCOUNTER (OUTPATIENT)
Age: 55
End: 2025-03-17

## 2025-04-03 ENCOUNTER — APPOINTMENT (OUTPATIENT)
Dept: ORTHOPEDIC SURGERY | Facility: CLINIC | Age: 55
End: 2025-04-03

## 2025-04-08 ENCOUNTER — TRANSCRIPTION ENCOUNTER (OUTPATIENT)
Age: 55
End: 2025-04-08

## 2025-05-09 ENCOUNTER — TRANSCRIPTION ENCOUNTER (OUTPATIENT)
Age: 55
End: 2025-05-09

## 2025-05-14 ENCOUNTER — APPOINTMENT (OUTPATIENT)
Dept: ORTHOPEDIC SURGERY | Facility: CLINIC | Age: 55
End: 2025-05-14

## 2025-05-14 VITALS — BODY MASS INDEX: 32.49 KG/M2 | HEIGHT: 65 IN | WEIGHT: 195 LBS

## 2025-05-14 DIAGNOSIS — M25.511 PAIN IN RIGHT SHOULDER: ICD-10-CM

## 2025-05-14 DIAGNOSIS — M24.811 OTHER SPECIFIC JOINT DERANGEMENTS OF RIGHT SHOULDER, NOT ELSEWHERE CLASSIFIED: ICD-10-CM

## 2025-05-14 DIAGNOSIS — S49.91XA UNSPECIFIED INJURY OF RIGHT SHOULDER AND UPPER ARM, INITIAL ENCOUNTER: ICD-10-CM

## 2025-05-14 DIAGNOSIS — M79.18 MYALGIA, OTHER SITE: ICD-10-CM

## 2025-05-14 PROCEDURE — 99204 OFFICE O/P NEW MOD 45 MIN: CPT | Mod: 25

## 2025-05-14 PROCEDURE — 72040 X-RAY EXAM NECK SPINE 2-3 VW: CPT

## 2025-05-14 PROCEDURE — 73030 X-RAY EXAM OF SHOULDER: CPT | Mod: RT

## 2025-05-14 RX ORDER — METHOCARBAMOL 500 MG/1
500 TABLET, FILM COATED ORAL
Qty: 30 | Refills: 0 | Status: ACTIVE | COMMUNITY
Start: 2025-05-14 | End: 1900-01-01

## 2025-05-14 RX ORDER — METHYLPREDNISOLONE 4 MG/1
4 TABLET ORAL
Qty: 1 | Refills: 0 | Status: ACTIVE | COMMUNITY
Start: 2025-05-14 | End: 1900-01-01

## 2025-05-14 RX ORDER — MELOXICAM 15 MG/1
15 TABLET ORAL DAILY
Qty: 30 | Refills: 0 | Status: ACTIVE | COMMUNITY
Start: 2025-05-14 | End: 1900-01-01

## 2025-05-28 ENCOUNTER — APPOINTMENT (OUTPATIENT)
Dept: ORTHOPEDIC SURGERY | Facility: CLINIC | Age: 55
End: 2025-05-28

## 2025-06-10 ENCOUNTER — APPOINTMENT (OUTPATIENT)
Dept: FAMILY MEDICINE | Facility: CLINIC | Age: 55
End: 2025-06-10
Payer: COMMERCIAL

## 2025-06-10 VITALS
TEMPERATURE: 97.4 F | HEIGHT: 65 IN | HEART RATE: 112 BPM | WEIGHT: 185 LBS | DIASTOLIC BLOOD PRESSURE: 72 MMHG | OXYGEN SATURATION: 98 % | BODY MASS INDEX: 30.82 KG/M2 | SYSTOLIC BLOOD PRESSURE: 126 MMHG

## 2025-06-10 PROCEDURE — G2211 COMPLEX E/M VISIT ADD ON: CPT | Mod: NC

## 2025-06-10 PROCEDURE — 99213 OFFICE O/P EST LOW 20 MIN: CPT

## 2025-06-16 ENCOUNTER — TRANSCRIPTION ENCOUNTER (OUTPATIENT)
Age: 55
End: 2025-06-16

## 2025-06-17 ENCOUNTER — TRANSCRIPTION ENCOUNTER (OUTPATIENT)
Age: 55
End: 2025-06-17

## 2025-06-18 ENCOUNTER — TRANSCRIPTION ENCOUNTER (OUTPATIENT)
Age: 55
End: 2025-06-18

## 2025-07-17 ENCOUNTER — TRANSCRIPTION ENCOUNTER (OUTPATIENT)
Age: 55
End: 2025-07-17